# Patient Record
Sex: FEMALE | Race: WHITE | HISPANIC OR LATINO | URBAN - METROPOLITAN AREA
[De-identification: names, ages, dates, MRNs, and addresses within clinical notes are randomized per-mention and may not be internally consistent; named-entity substitution may affect disease eponyms.]

---

## 2020-07-08 ENCOUNTER — OUTPATIENT (OUTPATIENT)
Dept: OUTPATIENT SERVICES | Facility: HOSPITAL | Age: 25
LOS: 1 days | Discharge: HOME | End: 2020-07-08
Payer: SELF-PAY

## 2020-07-08 VITALS — DIASTOLIC BLOOD PRESSURE: 74 MMHG | SYSTOLIC BLOOD PRESSURE: 114 MMHG | HEART RATE: 81 BPM

## 2020-07-08 VITALS
HEART RATE: 81 BPM | SYSTOLIC BLOOD PRESSURE: 114 MMHG | DIASTOLIC BLOOD PRESSURE: 74 MMHG | TEMPERATURE: 98 F | RESPIRATION RATE: 18 BRPM

## 2020-07-08 DIAGNOSIS — O26.899 OTHER SPECIFIED PREGNANCY RELATED CONDITIONS, UNSPECIFIED TRIMESTER: ICD-10-CM

## 2020-07-08 DIAGNOSIS — Z3A.00 WEEKS OF GESTATION OF PREGNANCY NOT SPECIFIED: ICD-10-CM

## 2020-07-08 PROCEDURE — 59025 FETAL NON-STRESS TEST: CPT | Mod: 26

## 2020-07-08 PROCEDURE — 76818 FETAL BIOPHYS PROFILE W/NST: CPT | Mod: 26

## 2020-07-08 PROCEDURE — 99203 OFFICE O/P NEW LOW 30 MIN: CPT

## 2020-07-08 NOTE — OB PROVIDER TRIAGE NOTE - HISTORY OF PRESENT ILLNESS
24y.o. ; Albanian speaking G1 at 39.4w presenting with complaints of painful uterine contractions throughout the course of the day.  Patient is presently repoting 9/10 pain.  Patient reports positive fetal movement, denies leakage of fluid, denies vaginal bleeding.    a/p course complications patient is unaware of  patient reports receiving "two shots" earlier in pregnancy; patient is unable to recall medications or indication; possibly rhogam  patient did report that she it was following an episode of vaginal bleeding      pmh: denies  psh: denies  obhx: denies  gynhx: denies     Medications  PNV    NKDA     used for translation; [id number]  Vickie Rubalcava 24y.o. ; Italian speaking G1 at 39.4w presenting with complaints of painful uterine contractions throughout the course of the day.  Patient is presently repoting 9/10 pain.  Patient reports positive fetal movement, denies leakage of fluid, denies vaginal bleeding.    a/p course complications patient is unaware of  patient reports receiving "two shots to keep the baby inside" earlier in pregnancy; patient is unable to recall medications; possibly rhogam  patient did report that she it was following an episode of vaginal bleeding    GBS (-) per Michael CUMMINGS     pmh: denies  psh: denies  obhx: denies  gynhx: denies     Medications  PNV    NKDA     used for translation; [id number]  Vickie Rubalcava     patient expected to deliver at [facility]

## 2020-07-08 NOTE — OB PROVIDER TRIAGE NOTE - NSOBPROVIDERNOTE_OBGYN_ALL_OB_FT
Plan  No evidence of labor at this time  patient cleared for discharge home ambulatory and stable  labor precautions reviewed  fetal kick counts emphasized  patient to follow up with provider as scheduled today at 1300 Plan  No evidence of labor at this time  patient cleared for discharge home ambulatory and stable  labor precautions reviewed  fetal kick counts emphasized  patient to follow up with provider as scheduled today at 1300    D/w Dr. Marin PGY-3 & Dr. David Benjamin

## 2020-07-08 NOTE — OB PROVIDER TRIAGE NOTE - NSHPPHYSICALEXAM_GEN_ALL_CORE
Vital Signs Last 24 Hrs  T(C): 36.8 (08 Jul 2020 00:54), Max: 36.8 (08 Jul 2020 00:54)  T(F): 98.2 (08 Jul 2020 00:54), Max: 98.2 (08 Jul 2020 00:54)  HR: 81 (08 Jul 2020 01:13) (81 - 81)  BP: 114/74 (08 Jul 2020 01:13) (114/74 - 114/74)  BP(mean): --  RR: 18 (08 Jul 2020 00:54) (18 - 18)  SpO2: --    regular heart rate; rhythm   lungs CTA     abdomen soft nontender gravid  (+) FHR; moderate variability; with accelerations  irregular uterine contractions noted on tocometer    Vaginal Exam: 1/40/-3    Biophysical Profile:   Amniotic Fluid Index: Vital Signs Last 24 Hrs  T(C): 36.8 (08 Jul 2020 00:54), Max: 36.8 (08 Jul 2020 00:54)  T(F): 98.2 (08 Jul 2020 00:54), Max: 98.2 (08 Jul 2020 00:54)  HR: 81 (08 Jul 2020 01:13) (81 - 81)  BP: 114/74 (08 Jul 2020 01:13) (114/74 - 114/74)  BP(mean): --  RR: 18 (08 Jul 2020 00:54) (18 - 18)  SpO2: --    regular heart rate; rhythm   lungs CTA     abdomen soft nontender gravid  (+) FHR; moderate variability; with accelerations  irregular uterine contractions noted on tocometer    Vaginal Exam: 1/40/-3    Biophysical Profile: 8/8  cephalic; posterior placenta  Amniotic Fluid Index: 5.8cm

## 2020-07-08 NOTE — OB PROVIDER TRIAGE NOTE - ADDITIONAL INSTRUCTIONS
No evidence of labor at this time  patient cleared for discharge home ambulatory and stable  labor precautions reviewed  fetal kick counts emphasized  patient to follow up with provider as scheduled today at 1300